# Patient Record
Sex: MALE | Race: WHITE | NOT HISPANIC OR LATINO | Employment: FULL TIME | URBAN - METROPOLITAN AREA
[De-identification: names, ages, dates, MRNs, and addresses within clinical notes are randomized per-mention and may not be internally consistent; named-entity substitution may affect disease eponyms.]

---

## 2017-03-15 ENCOUNTER — OFFICE VISIT (OUTPATIENT)
Dept: URGENT CARE | Facility: CLINIC | Age: 27
End: 2017-03-15
Payer: COMMERCIAL

## 2017-03-15 VITALS
WEIGHT: 250 LBS | HEART RATE: 92 BPM | TEMPERATURE: 97.3 F | HEIGHT: 73 IN | DIASTOLIC BLOOD PRESSURE: 70 MMHG | OXYGEN SATURATION: 96 % | SYSTOLIC BLOOD PRESSURE: 120 MMHG | BODY MASS INDEX: 33.13 KG/M2

## 2017-03-15 DIAGNOSIS — J00 NASOPHARYNGITIS: ICD-10-CM

## 2017-03-15 DIAGNOSIS — J40 BRONCHITIS: ICD-10-CM

## 2017-03-15 DIAGNOSIS — J45.20 RAD (REACTIVE AIRWAY DISEASE), MILD INTERMITTENT, UNCOMPLICATED: ICD-10-CM

## 2017-03-15 LAB
INT CON NEG: NEGATIVE
INT CON POS: POSITIVE
S PYO AG THROAT QL: NORMAL

## 2017-03-15 PROCEDURE — 87880 STREP A ASSAY W/OPTIC: CPT | Performed by: PHYSICIAN ASSISTANT

## 2017-03-15 PROCEDURE — 99203 OFFICE O/P NEW LOW 30 MIN: CPT | Mod: 25 | Performed by: PHYSICIAN ASSISTANT

## 2017-03-15 RX ORDER — CEFUROXIME AXETIL 500 MG/1
500 TABLET ORAL 2 TIMES DAILY
Qty: 20 TAB | Refills: 0 | Status: SHIPPED | OUTPATIENT
Start: 2017-03-15 | End: 2017-03-25

## 2017-03-15 RX ORDER — IPRATROPIUM BROMIDE AND ALBUTEROL SULFATE 2.5; .5 MG/3ML; MG/3ML
3 SOLUTION RESPIRATORY (INHALATION) EVERY 6 HOURS PRN
Qty: 36 ML | Refills: 3 | Status: SHIPPED | OUTPATIENT
Start: 2017-03-15

## 2017-03-15 RX ORDER — ALBUTEROL SULFATE 90 UG/1
2 AEROSOL, METERED RESPIRATORY (INHALATION) EVERY 4 HOURS PRN
Qty: 1 INHALER | Refills: 0 | Status: SHIPPED | OUTPATIENT
Start: 2017-03-15

## 2017-03-15 RX ORDER — PREDNISONE 20 MG/1
40 TABLET ORAL DAILY
Qty: 10 TAB | Refills: 0 | Status: SHIPPED | OUTPATIENT
Start: 2017-03-15 | End: 2017-03-20

## 2017-03-15 ASSESSMENT — ENCOUNTER SYMPTOMS
CONSTITUTIONAL NEGATIVE: 1
COUGH: 1
SORE THROAT: 1
SINUS PRESSURE: 1
WHEEZING: 1
HEADACHES: 1
CARDIOVASCULAR NEGATIVE: 1
SPUTUM PRODUCTION: 1
EYES NEGATIVE: 1

## 2017-03-15 NOTE — MR AVS SNAPSHOT
"        Gregorio Sawyer   3/15/2017 8:15 AM   Office Visit   MRN: [id number]    Department:  [facility]   Dept Phone:  [phone]    Description:  Male : 1990   Provider:  Torres Kelly PA-C           Reason for Visit     Sinus Problem X 4 days, sinus drainage ST, Productive cough, chest tightness and pressure, hx of asthma       Allergies as of 3/15/2017     Not on File      You were diagnosed with     Nasopharyngitis   [840000]       Bronchitis   [671325]       RAD (reactive airway disease), mild intermittent, uncomplicated   [9987033]         Vital Signs     Blood Pressure Pulse Temperature Height Weight Body Mass Index    120/70 mmHg 92 36.3 °C (97.3 °F) 1.854 m (6' 0.99\") 113.399 kg (250 lb) 32.99 kg/m2    Oxygen Saturation                   96%           Basic Information     Date Of Birth Sex Race Ethnicity Preferred Language    1990 Male White Non- English      Health Maintenance        Date Due Completion Dates    IMM HEP B VACCINE (1 of 3 - Primary Series) 1990 ---    IMM HEP A VACCINE (1 of 2 - Standard Series) 1992 ---    IMM HPV VACCINE (1 of 3 - Male 3 Dose Series) 2002 ---    IMM DTaP/Tdap/Td Vaccine (2 - Td) 10/30/2018 10/30/2008            Results     POCT Rapid Strep A      Component    Rapid Strep Screen    neg    Internal Control Positive    Positive    Internal Control Negative    Negative                        Current Immunizations     Influenza Vaccine Quad Inj (Preserved) 2016, 10/23/2015    MMR Vaccine 2015, 1996, 1991    Tdap Vaccine 10/30/2008    Varicella Vaccine Live 3/15/2007, 1995      Below and/or attached are the medications your provider expects you to take. Review all of your home medications and newly ordered medications with your provider and/or pharmacist. Follow medication instructions as directed by your provider and/or pharmacist. Please keep your medication list with you and share with your provider. " Update the information when medications are discontinued, doses are changed, or new medications (including over-the-counter products) are added; and carry medication information at all times in the event of emergency situations     Allergies:  No Known Allergies          Medications  Valid as of: March 15, 2017 - 10:09 AM    Generic Name Brand Name Tablet Size Instructions for use    Albuterol Sulfate (Aero Soln) albuterol 108 (90 BASE) MCG/ACT Inhale 2 Puffs by mouth every four hours as needed for Shortness of Breath.        Cefuroxime Axetil (Tab) CEFTIN 500 MG Take 1 Tab by mouth 2 times a day for 10 days.        Ipratropium-Albuterol (Solution) DUONEB 0.5-2.5 (3) MG/3ML 3 mL by Nebulization route every 6 hours as needed for Shortness of Breath.        NS SOLN 60 mL with albuterol 2.5 mg/0.5 mL NEBU 10 mL   10 mg/hr by Nebulization route.        PredniSONE (Tab) DELTASONE 20 MG Take 2 Tabs by mouth every day for 5 days.        .                 Medicines prescribed today were sent to:     [facility]/PHARMACY #[id number] [facility], [place] - [address] [address]    Phone: [phone] Fax: [phone]    Open 24 Hours?: No      Medication refill instructions:       If your prescription bottle indicates you have medication refills left, it is not necessary to call your provider’s office. Please contact your pharmacy and they will refill your medication.    If your prescription bottle indicates you do not have any refills left, you may request refills at any time through one of the following ways: The online T[employer] system (except Urgent Care), by calling your provider’s office, or by asking your pharmacy to contact your provider’s office with a refill request. Medication refills are processed only during regular business hours and may not be available until the next business day. Your provider may request additional information or to have a follow-up visit with you prior to refilling your medication.      *Please Note: Medication refills are assigned a new Rx number when refilled electronically. Your pharmacy may indicate that no refills were authorized even though a new prescription for the same medication is available at the pharmacy. Please request the medicine by name with the pharmacy before contacting your provider for a refill.           BookMyShow Access Code: [email or web address]  Expires: 4/14/2017  8:15 AM    BookMyShow  A secure, online tool to manage your health information     [employer]’s BookMyShow® is a secure, online tool that connects you to your personalized health information from the privacy of your home -- day or night - making it very easy for you to manage your healthcare. Once the activation process is completed, you can even access your medical information using the BookMyShow po, which is available for free in the Apple Po store or Google Play store.     BookMyShow provides the following levels of access (as shown below):   My Chart Features   [facility] Doctor [facility]  Specialists [facility]  Urgent  Care Non-[facility]  Primary Care  Doctor   Email your healthcare team securely and privately 24/7 X X X    Manage appointments: schedule your next appointment; view details of past/upcoming appointments X      Request prescription refills. X      View recent personal medical records, including lab and immunizations X X X X   View health record, including health history, allergies, medications X X X X   Read reports about your outpatient visits, procedures, consult and ER notes X X X X   See your discharge summary, which is a recap of your hospital and/or ER visit that includes your diagnosis, lab results, and care plan. X X       How to register for BookMyShow:  1. Go to  [email or web address].  2. Click on the Sign Up Now box, which takes you to the New Member Sign Up page. You will need to provide the following information:  a. Enter your BookMyShow Access Code exactly as it appears at the top of this  page. (You will not need to use this code after you’ve completed the sign-up process. If you do not sign up before the expiration date, you must request a new code.)   b. Enter your date of birth.   c. Enter your home email address.   d. Click Submit, and follow the next screen’s instructions.  3. Create a Makstr ID. This will be your Mak[employer] login ID and cannot be changed, so think of one that is secure and easy to remember.  4. Create a [employer] password. You can change your password at any time.  5. Enter your Password Reset Question and Answer. This can be used at a later time if you forget your password.   6. Enter your e-mail address. This allows you to receive e-mail notifications when new information is available in Makstr.  7. Click Sign Up. You can now view your health information.    For assistance activating your Mak[employer] account, call [phone]

## 2017-03-15 NOTE — Clinical Note
March 15, 2017         Patient: Gregorio Sawyer   YOB: 1990   Date of Visit: 3/15/2017           To Whom it May Concern:    Gregorio Sawyer was seen in my clinic on 3/15/2017 for illness; please excuse Thursday.    If you have any questions or concerns, please don't hesitate to call.        Sincerely,           Torres Kelly PA-C  Electronically Signed

## 2017-03-15 NOTE — PROGRESS NOTES
"Subjective:      Gregorio Sawyer is a 26 y.o. male who presents with Sinus Problem            Sinus Problem  This is a new (sinus vernon, st, prod cough/whz) problem. The problem is unchanged. There has been no fever. The fever has been present for less than 1 day. The pain is mild. Associated symptoms include congestion, coughing, headaches, sinus pressure and a sore throat. Past treatments include nothing. The treatment provided no relief.       Review of Systems   Constitutional: Negative.    HENT: Positive for congestion, sinus pressure and sore throat.    Eyes: Negative.    Respiratory: Positive for cough, sputum production and wheezing.    Cardiovascular: Negative.    Skin: Negative.    Neurological: Positive for headaches.          Objective:     /70 mmHg  Pulse 92  Temp(Src) 36.3 °C (97.3 °F)  Ht 1.854 m (6' 0.99\")  Wt 113.399 kg (250 lb)  BMI 32.99 kg/m2  SpO2 96%     Physical Exam   Constitutional: He is oriented to person, place, and time. He appears well-developed and well-nourished. No distress.   HENT:   Head: Normocephalic and atraumatic.   +maxill.sinus press.  +phar./tons redn     Eyes: EOM are normal. Pupils are equal, round, and reactive to light.   Neck: Normal range of motion. Neck supple.   Cardiovascular: Normal rate.    Pulmonary/Chest: Effort normal. No respiratory distress. He has wheezes. He has no rales.   Lymphadenopathy:     He has cervical adenopathy.   Neurological: He is alert and oriented to person, place, and time.   Skin: Skin is warm and dry.   Psychiatric: He has a normal mood and affect. His behavior is normal. Judgment and thought content normal.   Nursing note and vitals reviewed.    Filed Vitals:    03/15/17 0840   BP: 120/70   Pulse: 92   Temp: 36.3 °C (97.3 °F)   Height: 1.854 m (6' 0.99\")   Weight: 113.399 kg (250 lb)   SpO2: 96%     Active Ambulatory Problems     Diagnosis Date Noted   • No Active Ambulatory Problems     Resolved Ambulatory Problems     " Diagnosis Date Noted   • No Resolved Ambulatory Problems     No Additional Past Medical History     No current outpatient prescriptions on file prior to visit.     No current facility-administered medications on file prior to visit.     Gargles, Cepacol lozenges, Aleve/Advil as needed for throat pain  History reviewed. No pertinent family history.  Review of patient's allergies indicates not on file.         rst ng     Assessment/Plan:     ·  NASOPHARYNGITIS  Bronchitis/RAD      · ceftin rx; pred; albut